# Patient Record
Sex: FEMALE | Race: ASIAN | NOT HISPANIC OR LATINO | URBAN - METROPOLITAN AREA
[De-identification: names, ages, dates, MRNs, and addresses within clinical notes are randomized per-mention and may not be internally consistent; named-entity substitution may affect disease eponyms.]

---

## 2020-08-10 ENCOUNTER — INPATIENT (INPATIENT)
Facility: HOSPITAL | Age: 65
LOS: 7 days | Discharge: HOME | End: 2020-08-18
Attending: PSYCHIATRY & NEUROLOGY | Admitting: PSYCHIATRY & NEUROLOGY
Payer: COMMERCIAL

## 2020-08-10 VITALS
RESPIRATION RATE: 16 BRPM | HEART RATE: 66 BPM | TEMPERATURE: 99 F | OXYGEN SATURATION: 100 % | DIASTOLIC BLOOD PRESSURE: 94 MMHG | SYSTOLIC BLOOD PRESSURE: 134 MMHG

## 2020-08-10 DIAGNOSIS — F29 UNSPECIFIED PSYCHOSIS NOT DUE TO A SUBSTANCE OR KNOWN PHYSIOLOGICAL CONDITION: ICD-10-CM

## 2020-08-10 LAB
ALBUMIN SERPL ELPH-MCNC: 4.5 G/DL — SIGNIFICANT CHANGE UP (ref 3.3–5)
ALP SERPL-CCNC: 53 U/L — SIGNIFICANT CHANGE UP (ref 40–120)
ALT FLD-CCNC: 16 U/L — SIGNIFICANT CHANGE UP (ref 4–33)
AMPHET UR-MCNC: NEGATIVE — SIGNIFICANT CHANGE UP
ANION GAP SERPL CALC-SCNC: 13 MMO/L — SIGNIFICANT CHANGE UP (ref 7–14)
APAP SERPL-MCNC: < 15 UG/ML — LOW (ref 15–25)
APPEARANCE UR: CLEAR — SIGNIFICANT CHANGE UP
AST SERPL-CCNC: 12 U/L — SIGNIFICANT CHANGE UP (ref 4–32)
BACTERIA # UR AUTO: HIGH
BARBITURATES UR SCN-MCNC: NEGATIVE — SIGNIFICANT CHANGE UP
BASOPHILS # BLD AUTO: 0.02 K/UL — SIGNIFICANT CHANGE UP (ref 0–0.2)
BASOPHILS NFR BLD AUTO: 0.4 % — SIGNIFICANT CHANGE UP (ref 0–2)
BENZODIAZ UR-MCNC: NEGATIVE — SIGNIFICANT CHANGE UP
BILIRUB SERPL-MCNC: 0.6 MG/DL — SIGNIFICANT CHANGE UP (ref 0.2–1.2)
BILIRUB UR-MCNC: NEGATIVE — SIGNIFICANT CHANGE UP
BLOOD UR QL VISUAL: NEGATIVE — SIGNIFICANT CHANGE UP
BUN SERPL-MCNC: 7 MG/DL — SIGNIFICANT CHANGE UP (ref 7–23)
CALCIUM SERPL-MCNC: 9.4 MG/DL — SIGNIFICANT CHANGE UP (ref 8.4–10.5)
CANNABINOIDS UR-MCNC: NEGATIVE — SIGNIFICANT CHANGE UP
CHLORIDE SERPL-SCNC: 105 MMOL/L — SIGNIFICANT CHANGE UP (ref 98–107)
CO2 SERPL-SCNC: 23 MMOL/L — SIGNIFICANT CHANGE UP (ref 22–31)
COCAINE METAB.OTHER UR-MCNC: NEGATIVE — SIGNIFICANT CHANGE UP
COLOR SPEC: SIGNIFICANT CHANGE UP
CREAT SERPL-MCNC: 0.65 MG/DL — SIGNIFICANT CHANGE UP (ref 0.5–1.3)
EOSINOPHIL # BLD AUTO: 0.03 K/UL — SIGNIFICANT CHANGE UP (ref 0–0.5)
EOSINOPHIL NFR BLD AUTO: 0.6 % — SIGNIFICANT CHANGE UP (ref 0–6)
EPI CELLS # UR: SIGNIFICANT CHANGE UP
ETHANOL BLD-MCNC: < 10 MG/DL — SIGNIFICANT CHANGE UP
GLUCOSE SERPL-MCNC: 113 MG/DL — HIGH (ref 70–99)
GLUCOSE UR-MCNC: NEGATIVE — SIGNIFICANT CHANGE UP
HCT VFR BLD CALC: 41.8 % — SIGNIFICANT CHANGE UP (ref 34.5–45)
HGB BLD-MCNC: 13.7 G/DL — SIGNIFICANT CHANGE UP (ref 11.5–15.5)
IMM GRANULOCYTES NFR BLD AUTO: 0.2 % — SIGNIFICANT CHANGE UP (ref 0–1.5)
KETONES UR-MCNC: NEGATIVE — SIGNIFICANT CHANGE UP
LEUKOCYTE ESTERASE UR-ACNC: SIGNIFICANT CHANGE UP
LYMPHOCYTES # BLD AUTO: 1.08 K/UL — SIGNIFICANT CHANGE UP (ref 1–3.3)
LYMPHOCYTES # BLD AUTO: 22.3 % — SIGNIFICANT CHANGE UP (ref 13–44)
MCHC RBC-ENTMCNC: 30.9 PG — SIGNIFICANT CHANGE UP (ref 27–34)
MCHC RBC-ENTMCNC: 32.8 % — SIGNIFICANT CHANGE UP (ref 32–36)
MCV RBC AUTO: 94.4 FL — SIGNIFICANT CHANGE UP (ref 80–100)
METHADONE UR-MCNC: NEGATIVE — SIGNIFICANT CHANGE UP
MONOCYTES # BLD AUTO: 0.29 K/UL — SIGNIFICANT CHANGE UP (ref 0–0.9)
MONOCYTES NFR BLD AUTO: 6 % — SIGNIFICANT CHANGE UP (ref 2–14)
NEUTROPHILS # BLD AUTO: 3.42 K/UL — SIGNIFICANT CHANGE UP (ref 1.8–7.4)
NEUTROPHILS NFR BLD AUTO: 70.5 % — SIGNIFICANT CHANGE UP (ref 43–77)
NITRITE UR-MCNC: POSITIVE — HIGH
NRBC # FLD: 0 K/UL — SIGNIFICANT CHANGE UP (ref 0–0)
OPIATES UR-MCNC: NEGATIVE — SIGNIFICANT CHANGE UP
OXYCODONE UR-MCNC: NEGATIVE — SIGNIFICANT CHANGE UP
PCP UR-MCNC: NEGATIVE — SIGNIFICANT CHANGE UP
PH UR: 6.5 — SIGNIFICANT CHANGE UP (ref 5–8)
PLATELET # BLD AUTO: 255 K/UL — SIGNIFICANT CHANGE UP (ref 150–400)
PMV BLD: 9.6 FL — SIGNIFICANT CHANGE UP (ref 7–13)
POTASSIUM SERPL-MCNC: 4 MMOL/L — SIGNIFICANT CHANGE UP (ref 3.5–5.3)
POTASSIUM SERPL-SCNC: 4 MMOL/L — SIGNIFICANT CHANGE UP (ref 3.5–5.3)
PROT SERPL-MCNC: 6.6 G/DL — SIGNIFICANT CHANGE UP (ref 6–8.3)
PROT UR-MCNC: NEGATIVE — SIGNIFICANT CHANGE UP
RBC # BLD: 4.43 M/UL — SIGNIFICANT CHANGE UP (ref 3.8–5.2)
RBC # FLD: 12.5 % — SIGNIFICANT CHANGE UP (ref 10.3–14.5)
RBC CASTS # UR COMP ASSIST: SIGNIFICANT CHANGE UP (ref 0–?)
SALICYLATES SERPL-MCNC: < 5 MG/DL — LOW (ref 15–30)
SODIUM SERPL-SCNC: 141 MMOL/L — SIGNIFICANT CHANGE UP (ref 135–145)
SP GR SPEC: 1.01 — SIGNIFICANT CHANGE UP (ref 1–1.04)
TSH SERPL-MCNC: 1.03 UIU/ML — SIGNIFICANT CHANGE UP (ref 0.27–4.2)
UROBILINOGEN FLD QL: NORMAL — SIGNIFICANT CHANGE UP
WBC # BLD: 4.85 K/UL — SIGNIFICANT CHANGE UP (ref 3.8–10.5)
WBC # FLD AUTO: 4.85 K/UL — SIGNIFICANT CHANGE UP (ref 3.8–10.5)
WBC UR QL: SIGNIFICANT CHANGE UP (ref 0–?)

## 2020-08-10 PROCEDURE — 99285 EMERGENCY DEPT VISIT HI MDM: CPT | Mod: GC

## 2020-08-10 PROCEDURE — 70450 CT HEAD/BRAIN W/O DYE: CPT | Mod: 26

## 2020-08-10 RX ORDER — CEPHALEXIN 500 MG
500 CAPSULE ORAL ONCE
Refills: 0 | Status: COMPLETED | OUTPATIENT
Start: 2020-08-10 | End: 2020-08-10

## 2020-08-10 RX ORDER — HALOPERIDOL DECANOATE 100 MG/ML
1 INJECTION INTRAMUSCULAR EVERY 6 HOURS
Refills: 0 | Status: DISCONTINUED | OUTPATIENT
Start: 2020-08-11 | End: 2020-08-18

## 2020-08-10 RX ADMIN — Medication 500 MILLIGRAM(S): at 22:05

## 2020-08-10 NOTE — ED BEHAVIORAL HEALTH ASSESSMENT NOTE - RISK ASSESSMENT
Patient has elevated acute suicide risk - she is psychotic with paranoid delusions, unable to engage in safety planning, whimpers and backs off when asked about suicidality, has been self harming recently, tried to choke herself last week. Protective factors include no known psych history, is , lives at home with , no known prior SA. Moderate Acute Suicide Risk

## 2020-08-10 NOTE — ED BEHAVIORAL HEALTH ASSESSMENT NOTE - DESCRIPTION
Patient has been calm, in good behavioral control.    Vital Signs Last 24 Hrs  T(C): 36.9 (10 Aug 2020 19:30), Max: 37.1 (10 Aug 2020 13:56)  T(F): 98.4 (10 Aug 2020 19:30), Max: 98.8 (10 Aug 2020 13:56)  HR: 61 (10 Aug 2020 19:30) (61 - 66)  BP: 125/81 (10 Aug 2020 19:30) (125/81 - 134/94)  BP(mean): --  RR: 16 (10 Aug 2020 19:30) (16 - 16)  SpO2: 100% (10 Aug 2020 19:30) (100% - 100%) None known Lives at home with , unemployed, has not worked in many years, but worked when she was younger

## 2020-08-10 NOTE — ED PROVIDER NOTE - CARDIAC, MLM
----- Message from Burt Merida MD sent at 5/20/2019  5:41 PM EDT -----  Regarding: f/u with me  She needs to see me 1 unit at [facility] in the next few weeks to resume care from [facility].  CBC and CMP at f/u.  Thanks,  LETICIA    Normal rate, regular rhythm.  Heart sounds S1, S2.  No murmurs, rubs or gallops.

## 2020-08-10 NOTE — ED PROVIDER NOTE - OBJECTIVE STATEMENT
66 y/o F with no PMHx presents to ED for paranoia and SI, and was sent over by her family. She states that electronics are "spying on her" and are going to "have people kill her". She endorses suicidality to her family due to her paranoia. Her family has expressed to EMS that they have been quarantined for months and have not left the house since. The patient is Mandarin speaking and an  was used to communicate ( Li: [id number]). She refused to speak to him any further than just saying "it's in the computer". After that she refused to answer questions any further.

## 2020-08-10 NOTE — ED BEHAVIORAL HEALTH ASSESSMENT NOTE - ADDITIONAL DETAILS ALL
As per collateral, patient has been pinching herself and pulling her hair out, banging her head on the wall. On 8/4, she tried to choke herself with her hands, unknown intent.

## 2020-08-10 NOTE — ED ADULT NURSE NOTE - NSIMPLEMENTINTERV_GEN_ALL_ED
Implemented All Universal Safety Interventions:  Ada to call system. Call bell, personal items and telephone within reach. Instruct patient to call for assistance. Room bathroom lighting operational. Non-slip footwear when patient is off stretcher. Physically safe environment: no spills, clutter or unnecessary equipment. Stretcher in lowest position, wheels locked, appropriate side rails in place.
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
## 2020-08-10 NOTE — ED BEHAVIORAL HEALTH ASSESSMENT NOTE - MODIFICATIONS
I have seen and examined the patient with Dr ZARA Madrigal and performed key elements of the History and Mental Status Examination.  I concur with her assessment and recommendations.   I have discussed the Pt's assessment and plan of care with Dr ZARA Madrigal.   I agree with the note as stated above, having amended the EMR as needed to reflect my findings.  This includes during the time I functioned as the attending physician for this Pt at the -ED of [facility].
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
## 2020-08-10 NOTE — ED BEHAVIORAL HEALTH ASSESSMENT NOTE - PSYCHIATRIC ISSUES AND PLAN (INCLUDE STANDING AND PRN MEDICATION)
1. Start patient on Risperdal 1mg PO daily. 2. PRN: Ativan 0.5mg PO q6h PRN for anxiety. Ativan 2mg PO/IM q6h PRN + Haldol 1mg PO/IM q6h PRN for agitation.

## 2020-08-10 NOTE — ED BEHAVIORAL HEALTH ASSESSMENT NOTE - UNABLE TO CARE FOR SELF DETAILS
Patient has paranoid delusions which is significantly affecting her behavior - has not been leaving the house, has not been eating as much, self harming see HPI for details
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
## 2020-08-10 NOTE — ED BEHAVIORAL HEALTH ASSESSMENT NOTE - SUICIDE RISK FACTORS
Agitation/Severe Anxiety/Panic/Recent onset of current/past psychiatric diagnosis/Unable to engage in safety planning

## 2020-08-10 NOTE — ED PROVIDER NOTE - CLINICAL SUMMARY MEDICAL DECISION MAKING FREE TEXT BOX
66 y/o F with no known psyche history presents to ED for paranoia and SI. Plan for labs, EKG, and CT-head. If all negative then refer to psychiatry. 66 y/o F with no known psyche history presents to ED for paranoia and SI. Medical evaluation performed. There is no clinical evidence of intoxication or any acute medical problem requiring immediate intervention. Plan for labs, EKG, and CT-head. If all negative then consult psychiatry.

## 2020-08-10 NOTE — ED ADULT TRIAGE NOTE - CHIEF COMPLAINT QUOTE
pt brought from home by EMS, as per EMS pt acting "strange" as per family since beginning of quarantine, unable to be in room w/ electronics, no previous psych history, calm/cooperative on presentaiton

## 2020-08-10 NOTE — ED ADULT NURSE NOTE - OBJECTIVE STATEMENT
Pt is a 64yo female BIB EMS from home for paranoid and bizarre behavior at home. Believes electronic devices are spying on her.  Pt is mandarin speaking only.  service was used ( Li - id# - [id number]).. Pt not answering assessment questions. Pt keeps saying that the  ipad and other electronic devices are spying on her. Ongoing psychiatric evaluation in progress.

## 2020-08-10 NOTE — ED BEHAVIORAL HEALTH ASSESSMENT NOTE - OTHER
Jayden Edouard (see ED behavioral health note) Lives with  not assessed whimpers unable to assess, speaks in mandarin - david - unable to assess parmjit about electronics Inability to care for self
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
## 2020-08-11 DIAGNOSIS — F29 UNSPECIFIED PSYCHOSIS NOT DUE TO A SUBSTANCE OR KNOWN PHYSIOLOGICAL CONDITION: ICD-10-CM

## 2020-08-11 LAB
SARS-COV-2 IGG SERPL QL IA: NEGATIVE — SIGNIFICANT CHANGE UP
SARS-COV-2 IGM SERPL IA-ACNC: <0.1 INDEX — SIGNIFICANT CHANGE UP
SARS-COV-2 RNA SPEC QL NAA+PROBE: SIGNIFICANT CHANGE UP

## 2020-08-11 PROCEDURE — 99222 1ST HOSP IP/OBS MODERATE 55: CPT

## 2020-08-11 RX ORDER — CEPHALEXIN 500 MG
500 CAPSULE ORAL EVERY 12 HOURS
Refills: 0 | Status: DISCONTINUED | OUTPATIENT
Start: 2020-08-11 | End: 2020-08-14

## 2020-08-11 RX ORDER — RISPERIDONE 4 MG/1
1 TABLET ORAL AT BEDTIME
Refills: 0 | Status: DISCONTINUED | OUTPATIENT
Start: 2020-08-11 | End: 2020-08-11

## 2020-08-11 RX ORDER — RISPERIDONE 4 MG/1
1 TABLET ORAL
Refills: 0 | Status: DISCONTINUED | OUTPATIENT
Start: 2020-08-11 | End: 2020-08-14

## 2020-08-11 RX ADMIN — Medication 500 MILLIGRAM(S): at 08:35

## 2020-08-11 RX ADMIN — Medication 0.5 MILLIGRAM(S): at 13:05

## 2020-08-11 RX ADMIN — Medication 500 MILLIGRAM(S): at 20:02

## 2020-08-11 RX ADMIN — RISPERIDONE 1 MILLIGRAM(S): 4 TABLET ORAL at 18:29

## 2020-08-11 NOTE — ED ADULT NURSE REASSESSMENT NOTE - NS ED NURSE REASSESS COMMENT FT1
Pt received at 12am shift change. Pt presents calm but guarded, sitting at edge of bed and looking around with suspicious/paranoid type of behavior. Pt is awaiting Covid-19 results before inpatient psychiatric admission.

## 2020-08-12 LAB
CHOLEST SERPL-MCNC: 168 MG/DL — SIGNIFICANT CHANGE UP (ref 120–199)
HBA1C BLD-MCNC: 5.5 % — SIGNIFICANT CHANGE UP (ref 4–5.6)
HDLC SERPL-MCNC: 62 MG/DL — SIGNIFICANT CHANGE UP (ref 45–65)
LIPID PNL WITH DIRECT LDL SERPL: 88 MG/DL — SIGNIFICANT CHANGE UP
TRIGL SERPL-MCNC: 75 MG/DL — SIGNIFICANT CHANGE UP (ref 10–149)

## 2020-08-12 PROCEDURE — 99232 SBSQ HOSP IP/OBS MODERATE 35: CPT

## 2020-08-12 RX ORDER — CLONAZEPAM 1 MG
0.5 TABLET ORAL
Refills: 0 | Status: DISCONTINUED | OUTPATIENT
Start: 2020-08-12 | End: 2020-08-13

## 2020-08-12 RX ADMIN — Medication 0.5 MILLIGRAM(S): at 20:26

## 2020-08-12 RX ADMIN — RISPERIDONE 1 MILLIGRAM(S): 4 TABLET ORAL at 18:29

## 2020-08-12 RX ADMIN — Medication 500 MILLIGRAM(S): at 08:51

## 2020-08-12 RX ADMIN — Medication 500 MILLIGRAM(S): at 20:26

## 2020-08-13 PROCEDURE — 99232 SBSQ HOSP IP/OBS MODERATE 35: CPT

## 2020-08-13 RX ORDER — CLONAZEPAM 1 MG
0.5 TABLET ORAL THREE TIMES A DAY
Refills: 0 | Status: DISCONTINUED | OUTPATIENT
Start: 2020-08-13 | End: 2020-08-14

## 2020-08-13 RX ADMIN — RISPERIDONE 1 MILLIGRAM(S): 4 TABLET ORAL at 18:47

## 2020-08-13 RX ADMIN — Medication 500 MILLIGRAM(S): at 08:31

## 2020-08-13 RX ADMIN — Medication 0.5 MILLIGRAM(S): at 08:31

## 2020-08-14 PROCEDURE — 99222 1ST HOSP IP/OBS MODERATE 55: CPT

## 2020-08-14 PROCEDURE — 99232 SBSQ HOSP IP/OBS MODERATE 35: CPT

## 2020-08-14 RX ORDER — RISPERIDONE 4 MG/1
1.5 TABLET ORAL
Refills: 0 | Status: DISCONTINUED | OUTPATIENT
Start: 2020-08-14 | End: 2020-08-17

## 2020-08-14 RX ORDER — CLONAZEPAM 1 MG
0.5 TABLET ORAL
Refills: 0 | Status: DISCONTINUED | OUTPATIENT
Start: 2020-08-14 | End: 2020-08-17

## 2020-08-14 RX ORDER — POLYETHYLENE GLYCOL 3350 17 G/17G
17 POWDER, FOR SOLUTION ORAL DAILY
Refills: 0 | Status: DISCONTINUED | OUTPATIENT
Start: 2020-08-14 | End: 2020-08-18

## 2020-08-14 RX ADMIN — Medication 0.5 MILLIGRAM(S): at 18:54

## 2020-08-14 RX ADMIN — Medication 500 MILLIGRAM(S): at 09:39

## 2020-08-14 RX ADMIN — Medication 0.5 MILLIGRAM(S): at 09:39

## 2020-08-14 RX ADMIN — RISPERIDONE 1.5 MILLIGRAM(S): 4 TABLET ORAL at 18:54

## 2020-08-14 NOTE — CONSULT NOTE ADULT - ASSESSMENT
66 y/o F with no PMHx presents admitted to [facility] for for paranoia and SI. She was initially brought into the ED by her family for evaluation then transferred to [facility] on cephalexin for possibe UTI  for + u/a but inability to clarify symptoms 2/2 her psychosis.      UTI- Pt currently on cephalexin for UTI. Urine cx on 8/10 + for ESBL. Unclear if pt had true infection when she initially was evaluated in ED for psychosis. She has been afebrile w/o leukocytosis and no CVA tenderness.  -would dc cephalexin at this time bc it is not treating ESBL   -case d/w Dr. Roldan from ID. Given pt has never met any criteria for infection clinically recommend monitoring off abx for now.   -would repeat CBC on Monday while pt off abx. If pt w/ increasing WBC or fever will need to consider dose of fosfomycin vs transfer to [facility] for IV abx.     Psychosis - Management per primary psychiatry team

## 2020-08-14 NOTE — CONSULT NOTE ADULT - SUBJECTIVE AND OBJECTIVE BOX
HPI:   66 y/o F with no PMHx presents admitted to [facility] for for paranoia and SI. She was initially brought into the ED by her family for evaluation then transferred to [facility] on cephalexin for possibe UTI  for + u/a but inability to clarify symptoms 2/2 her psychosis.    Pt is Mandarin speaking and [place]  was used for communication. Today pt states that currently she has no dysuria, hematuria, abdominal pain back pain, fever, chills or urinary frequency. She sattes that she is "fine". When asked about prior/ recent urinary symptoms over the past several days she is unable to answer and states "I will be fine. I am fine".      PAST MEDICAL & SURGICAL HISTORY:  No pertinent past medical history  No significant past surgical history      Review of Systems:   CONSTITUTIONAL: No fever  EYES: No eye pain,   ENMT:  No sinus or throat pain  NECK: No pain or stiffness  RESPIRATORY: No shortness of breath  CARDIOVASCULAR: No chest pain, palpitations, or leg swelling  GASTROINTESTINAL: No abdominal or epigastric pain. No nausea, vomiting, or hematemesis; No diarrhea or constipation.  GENITOURINARY: No dysuria, frequency, hematuria, or incontinence  NEUROLOGICAL: No headaches,   SKIN: No itching, burning, rashes,  ENDOCRINE: No heat or cold intolerance; No hair loss  MUSCULOSKELETAL: No joint pain or swelling; NoHEME/LYMPH: No easy bruising, or bleeding gums  ALLERY AND IMMUNOLOGIC: No allergies     Allergies    No Known Allergies    Intolerances    Social History: Lives at home with , unemployed, has not worked in many years.     FAMILY HISTORY: Non contributory       MEDICATIONS  (STANDING):  cephalexin 500 milliGRAM(s) Oral every 12 hours  clonazePAM  Tablet 0.5 milliGRAM(s) Oral two times a day  risperiDONE  Disintegrating Tablet 1.5 milliGRAM(s) Oral <User Schedule>    MEDICATIONS  (PRN):  haloperidol     Tablet 1 milliGRAM(s) Oral every 6 hours PRN psychotic agitation  haloperidol    Injectable 1 milliGRAM(s) IntraMuscular every 6 hours PRN Agitation  LORazepam     Tablet 0.5 milliGRAM(s) Oral every 6 hours PRN Anxiety  LORazepam     Tablet 2 milliGRAM(s) Oral every 6 hours PRN psychotic agitation  LORazepam   Injectable 2 milliGRAM(s) IntraMuscular every 6 hours PRN SEVERE PSYCHOTIC AGITATION  polyethylene glycol 3350 17 Gram(s) Oral daily PRN constipation        PHYSICAL EXAM:  Vital Signs Last 24 Hrs  T(C): 36.3 (14 Aug 2020 08:30), Max: 36.3 (13 Aug 2020 20:20)  T(F): 97.4 (14 Aug 2020 08:30), Max: 97.4 (13 Aug 2020 20:20)  HR: 70 (14 Aug 2020 08:30) (70 - 70)  BP: 104/66 (14 Aug 2020 08:30) (104/66 - 104/66)  GENERAL: NAD, well-developed  HEAD:  Atraumatic, Normocephalic  EYES: EOMI,  conjunctiva and sclera clear  NECK: Supple,   CHEST/LUNG: Clear to auscultation bilaterally; No wheeze  HEART: Regular rate and rhythm; No murmurs, rubs, or gallops  ABDOMEN: Soft, Nontender, Nondistended; Bowel sounds present. NO CVA tenderness   EXTREMITIES:  2+ Peripheral Pulses, No clubbing, cyanosis, or edema  PSYCH: Calm but withdrawn, slow to respond at times   NEUROLOGY: non-focal  SKIN: No rashes or lesions    LABS:                    EKG(personally reviewed):    RADIOLOGY & ADDITIONAL TESTS:    Imaging Personally Reviewed:    Consultant(s) Notes Reviewed:      Care Discussed with Consultants/Other Providers:

## 2020-08-15 PROCEDURE — 99232 SBSQ HOSP IP/OBS MODERATE 35: CPT

## 2020-08-15 RX ADMIN — RISPERIDONE 1.5 MILLIGRAM(S): 4 TABLET ORAL at 19:12

## 2020-08-15 RX ADMIN — Medication 0.5 MILLIGRAM(S): at 09:41

## 2020-08-15 RX ADMIN — Medication 0.5 MILLIGRAM(S): at 19:12

## 2020-08-16 PROCEDURE — 99232 SBSQ HOSP IP/OBS MODERATE 35: CPT

## 2020-08-16 RX ADMIN — Medication 0.5 MILLIGRAM(S): at 18:50

## 2020-08-16 RX ADMIN — Medication 0.5 MILLIGRAM(S): at 08:34

## 2020-08-16 RX ADMIN — RISPERIDONE 1.5 MILLIGRAM(S): 4 TABLET ORAL at 18:50

## 2020-08-17 LAB
BASOPHILS # BLD AUTO: 0.01 K/UL — SIGNIFICANT CHANGE UP (ref 0–0.2)
BASOPHILS NFR BLD AUTO: 0.2 % — SIGNIFICANT CHANGE UP (ref 0–2)
EOSINOPHIL # BLD AUTO: 0.06 K/UL — SIGNIFICANT CHANGE UP (ref 0–0.5)
EOSINOPHIL NFR BLD AUTO: 1.1 % — SIGNIFICANT CHANGE UP (ref 0–6)
HCT VFR BLD CALC: 39 % — SIGNIFICANT CHANGE UP (ref 34.5–45)
HGB BLD-MCNC: 13.1 G/DL — SIGNIFICANT CHANGE UP (ref 11.5–15.5)
IMM GRANULOCYTES NFR BLD AUTO: 0.4 % — SIGNIFICANT CHANGE UP (ref 0–1.5)
LYMPHOCYTES # BLD AUTO: 1.17 K/UL — SIGNIFICANT CHANGE UP (ref 1–3.3)
LYMPHOCYTES # BLD AUTO: 20.6 % — SIGNIFICANT CHANGE UP (ref 13–44)
MCHC RBC-ENTMCNC: 32.1 PG — SIGNIFICANT CHANGE UP (ref 27–34)
MCHC RBC-ENTMCNC: 33.6 % — SIGNIFICANT CHANGE UP (ref 32–36)
MCV RBC AUTO: 95.6 FL — SIGNIFICANT CHANGE UP (ref 80–100)
MONOCYTES # BLD AUTO: 0.4 K/UL — SIGNIFICANT CHANGE UP (ref 0–0.9)
MONOCYTES NFR BLD AUTO: 7.1 % — SIGNIFICANT CHANGE UP (ref 2–14)
NEUTROPHILS # BLD AUTO: 4.01 K/UL — SIGNIFICANT CHANGE UP (ref 1.8–7.4)
NEUTROPHILS NFR BLD AUTO: 70.6 % — SIGNIFICANT CHANGE UP (ref 43–77)
NRBC # FLD: 0 K/UL — SIGNIFICANT CHANGE UP (ref 0–0)
PLATELET # BLD AUTO: 177 K/UL — SIGNIFICANT CHANGE UP (ref 150–400)
PMV BLD: 10.3 FL — SIGNIFICANT CHANGE UP (ref 7–13)
RBC # BLD: 4.08 M/UL — SIGNIFICANT CHANGE UP (ref 3.8–5.2)
RBC # FLD: 12.2 % — SIGNIFICANT CHANGE UP (ref 10.3–14.5)
WBC # BLD: 5.67 K/UL — SIGNIFICANT CHANGE UP (ref 3.8–10.5)
WBC # FLD AUTO: 5.67 K/UL — SIGNIFICANT CHANGE UP (ref 3.8–10.5)

## 2020-08-17 PROCEDURE — 99232 SBSQ HOSP IP/OBS MODERATE 35: CPT

## 2020-08-17 RX ORDER — RISPERIDONE 4 MG/1
2 TABLET ORAL
Refills: 0 | Status: DISCONTINUED | OUTPATIENT
Start: 2020-08-17 | End: 2020-08-18

## 2020-08-17 RX ORDER — CLONAZEPAM 1 MG
1 TABLET ORAL
Refills: 0 | Status: DISCONTINUED | OUTPATIENT
Start: 2020-08-17 | End: 2020-08-18

## 2020-08-17 RX ADMIN — RISPERIDONE 2 MILLIGRAM(S): 4 TABLET ORAL at 19:04

## 2020-08-17 RX ADMIN — Medication 0.5 MILLIGRAM(S): at 09:10

## 2020-08-17 RX ADMIN — Medication 1 MILLIGRAM(S): at 19:03

## 2020-08-17 NOTE — CHART NOTE - NSCHARTNOTEFT_GEN_A_CORE
Chart reviewed. Afebrile over the weekend and CBC from today wnl. If pt febrile or reports symptoms of UTI, would repeat UA/UCx and labs.

## 2020-08-18 VITALS — TEMPERATURE: 98 F

## 2020-08-18 PROCEDURE — 99238 HOSP IP/OBS DSCHRG MGMT 30/<: CPT

## 2020-08-18 RX ORDER — CLONAZEPAM 1 MG
0.5 TABLET ORAL
Refills: 0 | Status: DISCONTINUED | OUTPATIENT
Start: 2020-08-18 | End: 2020-08-18

## 2020-08-18 RX ORDER — CLONAZEPAM 1 MG
1 TABLET ORAL
Qty: 60 | Refills: 0
Start: 2020-08-18 | End: 2020-09-16

## 2020-08-18 RX ORDER — MIRTAZAPINE 45 MG/1
7.5 TABLET, ORALLY DISINTEGRATING ORAL AT BEDTIME
Refills: 0 | Status: DISCONTINUED | OUTPATIENT
Start: 2020-08-18 | End: 2020-08-18

## 2020-08-18 RX ORDER — RISPERIDONE 4 MG/1
1 TABLET ORAL
Qty: 30 | Refills: 0
Start: 2020-08-18 | End: 2020-09-16

## 2020-08-18 RX ORDER — CLONAZEPAM 1 MG
1 TABLET ORAL
Refills: 0 | Status: DISCONTINUED | OUTPATIENT
Start: 2020-08-18 | End: 2020-08-18

## 2020-08-18 RX ADMIN — Medication 1 MILLIGRAM(S): at 09:22

## 2023-07-26 NOTE — ED ADULT NURSE NOTE - CAS EDN DISCHARGE INTERVENTIONS
no abdominal pain, no bloating, no constipation, no diarrhea, no nausea and no vomiting.
Arm band on